# Patient Record
Sex: FEMALE | Race: WHITE | NOT HISPANIC OR LATINO | Employment: PART TIME | URBAN - METROPOLITAN AREA
[De-identification: names, ages, dates, MRNs, and addresses within clinical notes are randomized per-mention and may not be internally consistent; named-entity substitution may affect disease eponyms.]

---

## 2021-04-30 ENCOUNTER — ANNUAL EXAM (OUTPATIENT)
Dept: OBGYN CLINIC | Facility: CLINIC | Age: 66
End: 2021-04-30
Payer: COMMERCIAL

## 2021-04-30 VITALS
HEIGHT: 61 IN | SYSTOLIC BLOOD PRESSURE: 130 MMHG | BODY MASS INDEX: 22.47 KG/M2 | WEIGHT: 119 LBS | DIASTOLIC BLOOD PRESSURE: 80 MMHG

## 2021-04-30 DIAGNOSIS — Z12.4 CERVICAL CANCER SCREENING: ICD-10-CM

## 2021-04-30 DIAGNOSIS — Z12.31 BREAST CANCER SCREENING BY MAMMOGRAM: ICD-10-CM

## 2021-04-30 DIAGNOSIS — M85.88 OSTEOPENIA OF LUMBAR SPINE: ICD-10-CM

## 2021-04-30 DIAGNOSIS — Z01.419 ENCOUNTER FOR ANNUAL ROUTINE GYNECOLOGICAL EXAMINATION: Primary | ICD-10-CM

## 2021-04-30 DIAGNOSIS — N95.2 VAGINAL ATROPHY: ICD-10-CM

## 2021-04-30 PROCEDURE — 99387 INIT PM E/M NEW PAT 65+ YRS: CPT | Performed by: OBSTETRICS & GYNECOLOGY

## 2021-04-30 PROCEDURE — G0145 SCR C/V CYTO,THINLAYER,RESCR: HCPCS | Performed by: OBSTETRICS & GYNECOLOGY

## 2021-04-30 PROCEDURE — 87624 HPV HI-RISK TYP POOLED RSLT: CPT | Performed by: OBSTETRICS & GYNECOLOGY

## 2021-04-30 RX ORDER — ESTRADIOL 0.1 MG/G
1 CREAM VAGINAL 2 TIMES WEEKLY
Qty: 42.5 G | Refills: 4 | Status: SHIPPED | OUTPATIENT
Start: 2021-05-03 | End: 2021-07-18 | Stop reason: ALTCHOICE

## 2021-04-30 RX ORDER — PHENOL 1.4 %
600 AEROSOL, SPRAY (ML) MUCOUS MEMBRANE 2 TIMES DAILY WITH MEALS
COMMUNITY

## 2021-04-30 RX ORDER — MELATONIN
1000 DAILY
COMMUNITY

## 2021-04-30 RX ORDER — HYDROCHLOROTHIAZIDE 25 MG/1
12.5 TABLET ORAL DAILY
COMMUNITY
Start: 2021-04-16

## 2021-04-30 NOTE — PROGRESS NOTES
Medicare [address], [place]    ASSESSMENT/PLAN: Jean Claude Zacarias is a 72 y o   who presents for [facility] gynecologic wellness exam     Encounter for routine gynecologic examination  - Routine well woman exam completed today  - Cervical Cancer Screening - pap done today w/ HR HPV, last pap  neg w/ neg HR HPV, no h/o abnormal pap - if pap cotesting negative today, can stop pap screening  Pt agrees  - Breast Cancer Screening: Last Mammogram 10/26/2020 normal   - Colorectal cancer screening last: pt reports did Cologuard     - Osteoporosis screening last  w/ osteopenia  - The following were reviewed in today's visit: mammography screening ordered, menopause, osteoporosis, adequate intake of calcium and vitamin D, exercise and healthy diet  Discussed with patient Medicare (and plans that act like Medicare) pay for wellness visit q 2 yrs - but patient may return for problems as needed  Recommend annual breast exam and mammogram   If not seen by our office on her off year, she can still call and ask for a screening mammogram order and the nurses will provide one for her  Additional problems addressed during this visit:  1  Encounter for annual routine gynecological examination    2  Breast cancer screening by mammogram  -     Mammo screening bilateral w 3d & cad; Future; Expected date: 2022    3  Cervical cancer screening  -     Liquid-based pap, screening  -     HPV High Risk    4  Osteopenia of lumbar spine  Assessment & Plan:  Continue Calcium and Vit D in diet or supplementation, as well as weight bearing exercise  Orders:  -     DXA bone density spine hip and pelvis; Future; Expected date: 2022    5  Vaginal atrophy  Assessment & Plan:  Discussed UTIs can be common postmenopausally and often due to lack of estrogen in vaginal tissue  Vaginal estrogen often helps and decreases UTIs    Discussed cream, tablets and ring   Reviewed non-systemic estrogen, unlike hormone replacement therapy, so does not have same risks  Orders:  -     estradiol (ESTRACE) 0 1 mg/g vaginal cream; Insert 1 g into the vagina 2 (two) times a week Insert 1 gram into vagina 2x/week at night  Can decrease to weekly if still effective  Next visit: 1 year - GYN F/U      CC: Medicare Gynecologic Wellness Examination    HPI: Bhargav Freire is a 72 y o   who presents for [facility] gynecologic examination  Technically new patient  Ronnie Zeng was last seen in [facility] in  by one of our Nurse Practioners  She states her PCP encouraged her to return to gyn for routine care  Ronnie Zeng denies breast issues, postmenopause bleeding or urinary incontinence  She does report she has had a couple UTIs in the last year treated w/ antibiotics by her PCP  Gyn History   History normal pap smears  Last  neg w/ neg HR HPV    She  reports previously being sexually active   No currently       Past Medical History:  No date: Hypertension  No date: Osteopenia      Comment:  previously on Fosamax, stopped ; last Dexa in [address]                records      Past Surgical History:  : BREAST BIOPSY  No date: DILATION AND CURETTAGE OF UTERUS  2019: HERNIA REPAIR  2020: HERNIA REPAIR      Comment:  Reoccuring hernia repairs     Family History   Problem Relation Age of Onset    Hypertension Mother     Diabetes Mother     Hypertension Father     Breast cancer Neg Hx     Colon cancer Neg Hx         Social History     Tobacco Use    Smoking status: Never Smoker   Substance Use Topics    Alcohol use: Never     Frequency: Never     Binge frequency: Never    Drug use: Never          Current Outpatient Medications:     calcium carbonate (OS-PEDRO) 600 MG tablet, Take 600 mg by mouth 2 (two) times a day with meals, Disp: , Rfl:     cholecalciferol (VITAMIN D3) 1,000 units tablet, Take 1,000 Units by mouth daily, Disp: , Rfl:     hydrochlorothiazide (HYDRODIURIL) 25 mg tablet, Take 12 5 mg by mouth daily, Disp: , Rfl:     [START ON 5/3/2021] estradiol (ESTRACE) 0 1 mg/g vaginal cream, Insert 1 g into the vagina 2 (two) times a week Insert 1 gram into vagina 2x/week at night  Can decrease to weekly if still effective , Disp: 42 5 g, Rfl: 4    She has No Known Allergies       ROS negative except as noted in HPI    Objective:  /80   Ht 5' 1" (1 549 m)   Wt 54 kg (119 lb)   BMI 22 48 kg/m²      Physical Exam  Constitutional:       Appearance: Normal appearance  Chest:      Breasts:         Right: Normal  No mass or tenderness  Left: Normal  No mass or tenderness  Abdominal:      Palpations: Abdomen is soft  Tenderness: There is no abdominal tenderness  Genitourinary:     General: Normal vulva  Vagina: No bleeding or lesions  Cervix: Normal       Uterus: Normal  Not tender  Adnexa:         Right: No mass or tenderness  Left: No mass or tenderness  Rectum: No mass or external hemorrhoid  Normal anal tone  Comments: Atrophic changes appropriate to age  Musculoskeletal: Normal range of motion  Lymphadenopathy:      Upper Body:      Right upper body: No axillary adenopathy  Left upper body: No axillary adenopathy  Neurological:      Mental Status: She is alert and oriented to person, place, and time     Psychiatric:         Mood and Affect: Mood normal          Behavior: Behavior normal

## 2021-05-01 NOTE — ASSESSMENT & PLAN NOTE
Discussed UTIs can be common postmenopausally and often due to lack of estrogen in vaginal tissue  Vaginal estrogen often helps and decreases UTIs  Discussed cream, tablets and ring  Reviewed non-systemic estrogen, unlike hormone replacement therapy, so does not have same risks

## 2021-05-03 LAB
HPV HR 12 DNA CVX QL NAA+PROBE: NEGATIVE
HPV16 DNA CVX QL NAA+PROBE: NEGATIVE
HPV18 DNA CVX QL NAA+PROBE: NEGATIVE

## 2021-05-06 LAB
LAB AP GYN PRIMARY INTERPRETATION: NORMAL
Lab: NORMAL

## 2021-07-16 ENCOUNTER — TELEPHONE (OUTPATIENT)
Dept: OBGYN CLINIC | Facility: CLINIC | Age: 66
End: 2021-07-16

## 2021-07-16 DIAGNOSIS — N95.2 VAGINAL ATROPHY: Primary | ICD-10-CM

## 2021-07-16 NOTE — TELEPHONE ENCOUNTER
Pt was seen on 4/30/21 and provided with choices for estrogen  Pt was provided with a  prescription for Estradiol cream  Pt is requesting to have prescription changed to tablet vs the cream   [facility] -on file

## 2021-07-18 RX ORDER — ESTRADIOL 10 UG/1
1 INSERT VAGINAL 2 TIMES WEEKLY
Qty: 24 TABLET | Refills: 4 | Status: SHIPPED | OUTPATIENT
Start: 2021-07-19

## 2021-07-19 NOTE — TELEPHONE ENCOUNTER
Left message for pt informing prescription request change to a tablet (Vagifem) has been processed and transmitted to the pharmacy  If pt needs any further assistance to please call back

## 2023-07-12 ENCOUNTER — OFFICE VISIT (OUTPATIENT)
Dept: OBGYN CLINIC | Facility: CLINIC | Age: 68
End: 2023-07-12
Payer: COMMERCIAL

## 2023-07-12 VITALS
WEIGHT: 115.6 LBS | DIASTOLIC BLOOD PRESSURE: 60 MMHG | HEIGHT: 62 IN | BODY MASS INDEX: 21.27 KG/M2 | SYSTOLIC BLOOD PRESSURE: 128 MMHG

## 2023-07-12 DIAGNOSIS — N95.2 POST-MENOPAUSAL ATROPHIC VAGINITIS: ICD-10-CM

## 2023-07-12 DIAGNOSIS — N89.8 VAGINAL DISCHARGE: Primary | ICD-10-CM

## 2023-07-12 PROCEDURE — 99213 OFFICE O/P EST LOW 20 MIN: CPT | Performed by: OBSTETRICS & GYNECOLOGY

## 2023-07-12 RX ORDER — TORSEMIDE 10 MG/1
10 TABLET ORAL DAILY
COMMUNITY
Start: 2023-05-21

## 2023-09-07 NOTE — PROGRESS NOTES
Assessment/Plan:      Diagnoses and all orders for this visit:    Vaginal discharge     - informed patient of findings c/w atrophic vaginitis. Management with vaginal estrogen reviewed. Patient declined  Post-menopausal atrophic vaginitis      -  informed patient of findings c/w atrophic vaginitis. Management with vaginal estrogen reviewed. Patient declined  Other orders  -     torsemide (DEMADEX) 10 mg tablet; Take 10 mg by mouth daily 3/4 tablet -, 7.5 mg daily      Subjective:     Patient ID: Michael Chance is a 79 y.o. female. Patient presents with concerns about vaginal discharge that is light yellow in color. Denies having foul odor or vaginal discomfort. Patient not SA      Review of Systems   Constitutional: Negative. Negative for activity change, fatigue and unexpected weight change. Respiratory: Negative for cough, chest tightness and shortness of breath. Cardiovascular: Negative for chest pain and palpitations. Genitourinary: Positive for vaginal discharge. Negative for dyspareunia, frequency, pelvic pain, vaginal bleeding and vaginal pain. Hematological: Does not bruise/bleed easily. Objective:     Physical Exam  Vitals and nursing note reviewed. HENT:      Head: Normocephalic. Abdominal:      General: There is no distension. Palpations: Abdomen is soft. There is no mass. Tenderness: There is no abdominal tenderness. There is no rebound. Genitourinary:     General: Normal vulva. Exam position: Lithotomy position. Labia:         Right: No rash, tenderness or lesion. Left: No rash, tenderness or lesion. Urethra: No urethral pain or urethral lesion. Vagina: Normal. No vaginal discharge. Cervix: No cervical motion tenderness, lesion or erythema. Uterus: Normal.       Adnexa: Right adnexa normal and left adnexa normal.      Rectum: No external hemorrhoid.       Comments: Atrophic vaginal mucosa  Musculoskeletal:      Right lower leg: No edema. Left lower leg: No edema. Skin:     General: Skin is warm. Neurological:      Mental Status: She is alert and oriented to person, place, and time. Psychiatric:         Mood and Affect: Mood normal.         Behavior: Behavior normal.         Thought Content:  Thought content normal.